# Patient Record
(demographics unavailable — no encounter records)

---

## 2024-12-18 NOTE — CARDIOLOGY SUMMARY
[de-identified] : 11/18/24: SR 80 bpm narrow QRS,  ms [de-identified] : ETT 5/25/24 neg for ischemia or arrhythmia. HR 95 to 133 with exercise [de-identified] : TTE 7/6/24 LVEF 55%, LA 4.2cm, turbulent flow with resting gradient 9mmHg that increases to 23 mmHg, trace MR, trace TR, RVSP 33

## 2024-12-18 NOTE — DISCUSSION/SUMMARY
[EKG obtained to assist in diagnosis and management of assessed problem(s)] : EKG obtained to assist in diagnosis and management of assessed problem(s) [FreeTextEntry1] : 74 year old woman with history of DM, HTN, HLD, obesity, Asthma, OA, presenting for evaluation of palpitation.  She has a long history of palpitation, but recently the severity and frequency has significantly increased. These episodes range in duration from 30 minutes to a full day, and during these episodes she experiences debilitating symptoms.  Recent monitoring revealed only brief episodes of pAT lasting up to 6 seconds, but did not detect any significant arrhythmias to correlate with her symptomatic episodes. Its unclear at this point if she has an arrhythmic etiology of her palpitation. Given her symptoms, risk factors and brief pAT, there is concern for potential atrial fibrillation (which has not yet been seen). She would benefit from further monitoring to clarify the etiology of her symptoms nad evaluate for AF (which would warrant anticoagulation and rhythm control if diagnosed). we discussed monitoring options like ILR implantation, and repeat wearable monitors. Given her frequent symptoms will start with noninvasve external monitoring at this time and will wear a 2 week ZIO monitor. Given her persistent symptoms, will trial beta blocker for management of palpitation. Will discontinue cardizem and initiate Metoprolol.  Will discontinue metoprolol if asthma symptoms worsen.  Recommendations:  - Discontinue cardizem - initiate Metoprolol  50mg qd (discontinue if worsening reactive airway disease) - 2 week ZIO monitor with symptom correlation -cardiology followup with Dr Nelson - EP follow up 4-6 months or prn

## 2024-12-18 NOTE — CARDIOLOGY SUMMARY
[de-identified] : 11/18/24: SR 80 bpm narrow QRS,  ms [de-identified] : ETT 5/25/24 neg for ischemia or arrhythmia. HR 95 to 133 with exercise [de-identified] : TTE 7/6/24 LVEF 55%, LA 4.2cm, turbulent flow with resting gradient 9mmHg that increases to 23 mmHg, trace MR, trace TR, RVSP 33

## 2024-12-18 NOTE — END OF VISIT
[Time Spent: ___ minutes] : I have spent [unfilled] minutes of time on the encounter which excludes teaching and separately reported services. [FreeTextEntry4] :  I, Orlando Berman, am scribing for and in the presence of Dr. Mesa in the following sections HISTORY OF PRESENT ILLNESSS, PAST MEDICAL/FAMILY/SOCIAL HISTORY; REVIEW OF SYSTEMS; VITAL SIGNS; PHYSICAL EXAM; ASSESSMENT/PLAN [FreeTextEntry3] : I, Parveen Mesa, personally performed the services described in this documentation. All medical record entries made by the scribe were at my direction and in my presence. I have reviewed the chart and agree that the record reflects my personal performance and is accurate and complete.            I was present for the above evaluation and agree with the history, physical examination, assessment and plan as above.

## 2024-12-18 NOTE — REVIEW OF SYSTEMS
[SOB] : shortness of breath [Palpitations] : palpitations [Lower Ext Edema] : no extremity edema [Syncope] : no syncope [FreeTextEntry1] :  A complete review of systems was obtained and is negative except as stated in HPI

## 2024-12-18 NOTE — PHYSICAL EXAM
[Well Developed] : well developed [No Acute Distress] : no acute distress [Normal Venous Pressure] : normal venous pressure [No Carotid Bruit] : no carotid bruit [Normal S1, S2] : normal S1, S2 [No Murmur] : no murmur [Clear Lung Fields] : clear lung fields [Good Air Entry] : good air entry [No Respiratory Distress] : no respiratory distress  [Normal Gait] : normal gait [Moves all extremities] : moves all extremities [Alert and Oriented] : alert and oriented

## 2024-12-18 NOTE — HISTORY OF PRESENT ILLNESS
[FreeTextEntry1] : 74 year old woman with history of DM, HTN, HLD, obesity, Asthma, OA,, presenting for evaluation of palpitation.  She has been having episodes of strong and highly bothersome palpitation with lightheadedness. She reports that she has had palpitation for years, but recently the intensity and frequency has significantly increased, sometimes so much so that she considers going to the ED. During these episodes she is very debilitated.  These episodes range in duration last from a half hour to a full day.  She denies syncope or near syncope. Event monitor 5/29-6/5/24 revealed sinus rhythm (avg 72, range  bpm) with episodes of pAT up to 13 beats with rapid rates, but no sustained arrhythmia. The symptom triggered events correlated with sinus rhythm.  ECG today reveals SR 80 bpm  She has started on Diltiazem 300mg qd, but she did not notice improvement in her palpitation.  She has a long history of asthma, but reports that recently her asthma symptoms have been mild. She takes Breo Ellipta and occasionally rescue albuterol inhaler. Her day to day responsibilities include taking are of her grandchildren, and she reports normal levels of stress.    Currrent meds include diltiazem 300mg qd, magnesium

## 2025-02-12 NOTE — CARDIOLOGY SUMMARY
[de-identified] : 2/12/25: NSR HR 69 BPM.  11/18/24: SR 80 bpm narrow QRS,  ms [de-identified] : Zio 12/18-24/1/1/25: SR avg HR 73bpm (range 54-122bpm), rare PVCs and PACs <1% Monitor 5/29-6/5/24: sinus rhythm (avg 72, range  bpm) with episodes of pAT up to 13 beats with rapid rates, but no sustained arrhythmia. [de-identified] : ETT 5/25/24 neg for ischemia or arrhythmia. HR 95 to 133 with exercise [de-identified] : TTE 7/6/24 LVEF 55%, LA 4.2cm, turbulent flow with resting gradient 9mmHg that increases to 23 mmHg, trace MR, trace TR, RVSP 33

## 2025-02-12 NOTE — REVIEW OF SYSTEMS
[Palpitations] : palpitations [Feeling Fatigued] : not feeling fatigued [SOB] : no shortness of breath [Dyspnea on exertion] : not dyspnea during exertion [Chest Discomfort] : no chest discomfort [Lower Ext Edema] : no extremity edema [Syncope] : no syncope [Dizziness] : no dizziness [Anxiety] : no anxiety [FreeTextEntry5] : see HPI - brief "fluttering"  [FreeTextEntry1] :  A complete review of systems was obtained and is negative except as stated in HPI

## 2025-02-12 NOTE — HISTORY OF PRESENT ILLNESS
[FreeTextEntry1] : 74 year old woman with history of DM, HTN, HLD, obesity, Asthma, OA,, presenting for followup of palpitation.  She initially presented with episodes of strong and highly bothersome palpitation with lightheadedness. She reports that she had palpitation for years, but the intensity and frequency has significantly increased, and during these episodes she is very debilitated.  These episodes previously lasted range in duration last from a half hour to a full day. She denies syncope or near syncope.  Event monitor 5/29-6/5/24 revealed sinus rhythm (avg 72, range  bpm) with episodes of pAT up to 13 beats with rapid rates, but no sustained arrhythmia. The symptom triggered events correlated with sinus rhythm.   She was initially started on Diltiazem 300mg qd, but she did not notice improvement in her palpitation. At last visit this was changed to Toprol 50mg qd. With this her symptoms have markedly improved, and she now notes only minimal palpitation lasting seconds.  Subsequent monitor/ Zio:12/18/24-1/1/25 revealed SR avg HR 73bpm (range 54-122bpm), rare PVCs and PACs <1%. There were no significant arrhythmias.   She reported to the office today for arrhythmia follow-up. She is overall feeling well and denies palpitations, dizziness, SOB, syncope or near syncope. She reports an occasional brief fluttering in her chest ~ 1 x month lasting a few seconds, but it does not cause any severe symptoms.  Current meds include Metoprolol 50mg QD, Magnesium.

## 2025-02-12 NOTE — DISCUSSION/SUMMARY
[FreeTextEntry1] : 74 year old woman with history of DM, HTN, HLD, obesity, Asthma, OA, presenting for followup of palpitation.  Though she initially had highly bothersome palpitation, since switching to Metoprolol, she reports a significant improvement in symptoms. She no longer has felt any severe arrhythmia symptoms only and occasional brief fluttering lasting ~ few seconds in duration. She denies any dizziness or lightheadedness with this. Most Recent Zio monitor worn from 12/18/24-1/1/25 revealed no significant arrhythmia with HR range from 56 bpm to 122 bpm.  Thus far no significant arrhythmias have been noted. There is still some suspicion for possible pAF, though this has not been seen.  We discussed further options for arrythmia monitoring with smart watch with AF detection.  If symptoms continue and no diagnosis made, could reconsider ILR implant.   Recommendations:  -Continue Metoprolol 50mg qd -Apple Watch for AF surviellance -t/c repeat event monitoring vs ILR in future -Cardiology follow-up with Dr Nelson -EP follow up annually or sooner PRN [EKG obtained to assist in diagnosis and management of assessed problem(s)] : EKG obtained to assist in diagnosis and management of assessed problem(s)

## 2025-02-12 NOTE — CARDIOLOGY SUMMARY
[de-identified] : 2/12/25: NSR HR 69 BPM.  11/18/24: SR 80 bpm narrow QRS,  ms [de-identified] : Zio 12/18-24/1/1/25: SR avg HR 73bpm (range 54-122bpm), rare PVCs and PACs <1% Monitor 5/29-6/5/24: sinus rhythm (avg 72, range  bpm) with episodes of pAT up to 13 beats with rapid rates, but no sustained arrhythmia. [de-identified] : ETT 5/25/24 neg for ischemia or arrhythmia. HR 95 to 133 with exercise [de-identified] : TTE 7/6/24 LVEF 55%, LA 4.2cm, turbulent flow with resting gradient 9mmHg that increases to 23 mmHg, trace MR, trace TR, RVSP 33